# Patient Record
Sex: FEMALE | Race: WHITE
[De-identification: names, ages, dates, MRNs, and addresses within clinical notes are randomized per-mention and may not be internally consistent; named-entity substitution may affect disease eponyms.]

---

## 2018-04-19 ENCOUNTER — HOSPITAL ENCOUNTER (EMERGENCY)
Dept: HOSPITAL 74 - JER | Age: 38
Discharge: HOME | End: 2018-04-19
Payer: COMMERCIAL

## 2018-04-19 VITALS — HEART RATE: 90 BPM

## 2018-04-19 VITALS — SYSTOLIC BLOOD PRESSURE: 137 MMHG | TEMPERATURE: 98.5 F | DIASTOLIC BLOOD PRESSURE: 79 MMHG

## 2018-04-19 VITALS — BODY MASS INDEX: 22.4 KG/M2

## 2018-04-19 DIAGNOSIS — G35: ICD-10-CM

## 2018-04-19 DIAGNOSIS — M62.830: Primary | ICD-10-CM

## 2018-04-19 PROCEDURE — 3E0233Z INTRODUCTION OF ANTI-INFLAMMATORY INTO MUSCLE, PERCUTANEOUS APPROACH: ICD-10-PCS

## 2018-04-19 NOTE — PDOC
History of Present Illness





- General


Chief Complaint: Pain


Stated Complaint: PAIN


Time Seen by Provider: 04/19/18 19:49


History Source: Patient


Exam Limitations: No Limitations (36y/o F with h/O MS here with upper back spasm

)





Past History





- Past Medical History


Allergies/Adverse Reactions: 


 Allergies











Allergy/AdvReac Type Severity Reaction Status Date / Time


 


acetaminophen [From Percocet] Allergy   Verified 04/19/18 19:56


 


oxycodone HCl [From Percocet] Allergy   Verified 04/19/18 19:56


 


Shellfish Allergy   Verified 04/19/18 19:56











Home Medications: 


Ambulatory Orders





Cyclobenzaprine HCl [Flexeril 10 mg] 10 mg PO BID #20 tablet 04/19/18 


Ibuprofen 800 mg PO ACDIN #21 tablet 04/19/18 


Teriflunomide [Aubagio] 14 mg PO DAILY 04/19/18 








COPD: No


Diabetes: Yes


Other medical history: ms





- Immunization History


Immunization Up to Date: Yes





- Suicide/Smoking/Psychosocial Hx


Smoking Status: No


Smoking History: Current every day smoker


Have you smoked in the past 12 months: No


Number of Cigarettes Smoked Daily: 2


Information on smoking cessation initiated: No


'Breaking Loose' booklet given: 01/07/14


Hx Alcohol Use: No


Drug/Substance Use Hx: No


Substance Use Type: None





*Physical Exam





- Vital Signs


 Last Vital Signs











Temp Pulse Resp BP Pulse Ox


 


 98.5 F   114 H  20   137/79   100 


 


 04/19/18 19:56  04/19/18 19:56  04/19/18 19:56  04/19/18 19:56  04/19/18 19:56














Medical Decision Making





- Medical Decision Making





04/19/18 20:20


36y/o F with h/o DM, MS (follows up with pain management at [facility]), managment 

involves trigger point injection q3 months, she has not been to pain managment 

in 8 months. She p/w spasm to upper back/shoulder for several days, denies 

trauma, CP, sOB 


PE:


tense musle noted


pain control


f/u pain management 





*DC/Admit/Observation/Transfer


Diagnosis at time of Disposition: 


 Neck pain, Muscle spasm of back








- Discharge Dispostion


Disposition: HOME


Condition at time of disposition: Stable


Admit: No





- Prescriptions


Prescriptions: 


Cyclobenzaprine HCl [Flexeril 10 mg] 10 mg PO BID #20 tablet


Ibuprofen 800 mg PO ACDIN #21 tablet





- Referrals





- Patient Instructions


Printed Discharge Instructions:  DI for Back Spasm


Additional Instructions: 


Please follow up with Dr Ceja 





- Post Discharge Activity

## 2018-04-19 NOTE — PDOC
Rapid Medical Evaluation


Time Seen by Provider: 04/19/18 19:49


Medical Evaluation: 


 Allergies











Allergy/AdvReac Type Severity Reaction Status Date / Time


 


acetaminophen [From Percocet] Allergy   Verified 01/21/16 15:07


 


oxycodone HCl [From Percocet] Allergy   Verified 01/21/16 15:07


 


Shellfish Allergy   Verified 01/21/16 15:07











04/19/18 19:51


I have performed a brief in-person evaluation of this patient. 


The patient presents with a chief complaint of: hx of MS, pain to neck and 

shoulders, "usually get trigger point injections but i haven't seen her in 8 

months" 


Pertinent physical exam findings: limited ROM to neck secondary to pain, 

tenderness to midline thoracic spine, tachy to 114


I have ordered the following: urine preg 


The patient will proceed to the ED for further evaluation.








**Discharge Disposition





- Diagnosis


 Neck pain








- Referrals





- Patient Instructions





- Post Discharge Activity